# Patient Record
(demographics unavailable — no encounter records)

---

## 2024-11-27 NOTE — HISTORY OF PRESENT ILLNESS
[FreeTextEntry8] : Patient presented today to establish care with a new provider.  She stated that she gets some episodes of dizziness with headache and blood pressure spikes here and there.  She checks her blood pressure at home but she does not document it and she does not remember the readings.  Last few readings are in 120s.  She stated that 1 time she ended up being in a hospital because of that.  But she was not started on any medications.  And she does not remember how high her blood pressure was.  On today's visit patient's blood pressure was normal.  Patient uses a wrist device to measure her blood pressure Works as a

## 2025-03-23 NOTE — PHYSICAL EXAM
[No Acute Distress] : no acute distress [Well Nourished] : well nourished [Well Developed] : well developed [Well-Appearing] : well-appearing [Normal Sclera/Conjunctiva] : normal sclera/conjunctiva [PERRL] : pupils equal round and reactive to light [EOMI] : extraocular movements intact [Normal Outer Ear/Nose] : the outer ears and nose were normal in appearance [Normal Oropharynx] : the oropharynx was normal [No JVD] : no jugular venous distention [No Lymphadenopathy] : no lymphadenopathy [Supple] : supple [Thyroid Normal, No Nodules] : the thyroid was normal and there were no nodules present [No Respiratory Distress] : no respiratory distress  [No Accessory Muscle Use] : no accessory muscle use [Clear to Auscultation] : lungs were clear to auscultation bilaterally [Normal Rate] : normal rate  [Regular Rhythm] : with a regular rhythm [Normal S1, S2] : normal S1 and S2 [No Murmur] : no murmur heard [No Carotid Bruits] : no carotid bruits [No Abdominal Bruit] : a ~M bruit was not heard ~T in the abdomen [No Varicosities] : no varicosities [Pedal Pulses Present] : the pedal pulses are present [No Edema] : there was no peripheral edema [No Palpable Aorta] : no palpable aorta [No Extremity Clubbing/Cyanosis] : no extremity clubbing/cyanosis [Soft] : abdomen soft [Non Tender] : non-tender [Non-distended] : non-distended [No Masses] : no abdominal mass palpated [No HSM] : no HSM [Normal Bowel Sounds] : normal bowel sounds [Normal Posterior Cervical Nodes] : no posterior cervical lymphadenopathy [Normal Anterior Cervical Nodes] : no anterior cervical lymphadenopathy [No CVA Tenderness] : no CVA  tenderness [No Spinal Tenderness] : no spinal tenderness [No Joint Swelling] : no joint swelling [Grossly Normal Strength/Tone] : grossly normal strength/tone [No Rash] : no rash [Coordination Grossly Intact] : coordination grossly intact [No Focal Deficits] : no focal deficits [Normal Gait] : normal gait [Deep Tendon Reflexes (DTR)] : deep tendon reflexes were 2+ and symmetric [Normal Affect] : the affect was normal [Normal Insight/Judgement] : insight and judgment were intact [Normal TMs] : both tympanic membranes were normal [Normal Supraclavicular Nodes] : no supraclavicular lymphadenopathy [Normal Mood] : the mood was normal

## 2025-03-24 NOTE — HEALTH RISK ASSESSMENT
[Yes] : Yes [2 - 4 times a month (2 pts)] : 2-4 times a month (2 points) [1 or 2 (0 pts)] : 1 or 2 (0 points) [Never (0 pts)] : Never (0 points) [No] : In the past 12 months have you used drugs other than those required for medical reasons? No [1] : 2) Feeling down, depressed, or hopeless for several days (1) [Patient reported mammogram was normal] : Patient reported mammogram was normal [Patient reported PAP Smear was normal] : Patient reported PAP Smear was normal [Patient reported colonoscopy was normal] : Patient reported colonoscopy was normal [With Family] : lives with family [# of Members in Household ___] :  household currently consist of [unfilled] member(s) [Employed] : employed [] :  [# Of Children ___] : has [unfilled] children [Sexually Active] : sexually active [Feels Safe at Home] : Feels safe at home [Never] : Never [Fully functional (bathing, dressing, toileting, transferring, walking, feeding)] : Fully functional (bathing, dressing, toileting, transferring, walking, feeding) [Fully functional (using the telephone, shopping, preparing meals, housekeeping, doing laundry, using] : Fully functional and needs no help or supervision to perform IADLs (using the telephone, shopping, preparing meals, housekeeping, doing laundry, using transportation, managing medications and managing finances) [Audit-CScore] : 2 [de-identified] : active, not exercising.  [de-identified] : eats vegetables, fruits, proteins. Intermittent fasting. Fast food once a week. Occasional sodas.  [[id number]] : 2 [Change in mental status noted] : No change in mental status noted [Reports changes in hearing] : Reports no changes in hearing [Reports changes in vision] : Reports no changes in vision [Reports changes in dental health] : Reports no changes in dental health [MammogramDate] : 11/23 [PapSmearDate] : 06/24 [ColonoscopyDate] : 01/19 [de-identified] : School .  [de-identified] : uses glasses, recent eval.

## 2025-03-24 NOTE — HEALTH RISK ASSESSMENT
[Yes] : Yes [2 - 4 times a month (2 pts)] : 2-4 times a month (2 points) [1 or 2 (0 pts)] : 1 or 2 (0 points) [Never (0 pts)] : Never (0 points) [No] : In the past 12 months have you used drugs other than those required for medical reasons? No [1] : 2) Feeling down, depressed, or hopeless for several days (1) [Patient reported mammogram was normal] : Patient reported mammogram was normal [Patient reported PAP Smear was normal] : Patient reported PAP Smear was normal [Patient reported colonoscopy was normal] : Patient reported colonoscopy was normal [With Family] : lives with family [# of Members in Household ___] :  household currently consist of [unfilled] member(s) [Employed] : employed [] :  [# Of Children ___] : has [unfilled] children [Sexually Active] : sexually active [Feels Safe at Home] : Feels safe at home [Never] : Never [Fully functional (bathing, dressing, toileting, transferring, walking, feeding)] : Fully functional (bathing, dressing, toileting, transferring, walking, feeding) [Fully functional (using the telephone, shopping, preparing meals, housekeeping, doing laundry, using] : Fully functional and needs no help or supervision to perform IADLs (using the telephone, shopping, preparing meals, housekeeping, doing laundry, using transportation, managing medications and managing finances) [Audit-CScore] : 2 [de-identified] : active, not exercising.  [de-identified] : eats vegetables, fruits, proteins. Intermittent fasting. Fast food once a week. Occasional sodas.  [[id number]] : 2 [Change in mental status noted] : No change in mental status noted [Reports changes in hearing] : Reports no changes in hearing [Reports changes in vision] : Reports no changes in vision [Reports changes in dental health] : Reports no changes in dental health [MammogramDate] : 11/23 [PapSmearDate] : 06/24 [ColonoscopyDate] : 01/19 [de-identified] : School .  [de-identified] : uses glasses, recent eval.

## 2025-03-24 NOTE — HISTORY OF PRESENT ILLNESS
[FreeTextEntry1] : CPE  Establish care [de-identified] : Pt is 62 year old female with PMH of anxiety and seasonal depression, urinary incontinence, KEISHA on CPAP, visiting for CPE and establish care.   Pt has been on Lexapro for more than 1 year and she feels better.  Pt would like to be evaluated for ADHD. Per pt she has had symptoms all her life and worse for the past 2 years. She has expressed concerns about memory issues/changes that she believes might be signs of ADHD. The patient reports being easily distracted and forgetful, she forgets simple things from daily life worsening over the past 1-2 years. H/o "stomach issues", reports abdominal discomfort and bloating, but now she reports new constipation with BM every 2 days for the past 3 months. No melena or hematochezia.  Last PAP: 6/2024 Last mammo: 11/2023. Dexa never done  Colonoscopy: more than 5 years ago, does not recall year Shingles not vaccinated.  Pneumonia, flu, Covid vaccinated

## 2025-03-24 NOTE — ASSESSMENT
[FreeTextEntry1] : CPE EKG: NSR Updated with PAP Mammo referral given  GI - colonoscopy referral Recommended to receive shingles vaccine at her pharmacy. Information provided about no indication for polio and TB vaccine not available/indicated.  Labs ordered.

## 2025-03-24 NOTE — HISTORY OF PRESENT ILLNESS
[FreeTextEntry1] : CPE  Establish care [de-identified] : Pt is 62 year old female with PMH of anxiety and seasonal depression, urinary incontinence, KEISHA on CPAP, visiting for CPE and establish care.   Pt has been on Lexapro for more than 1 year and she feels better.  Pt would like to be evaluated for ADHD. Per pt she has had symptoms all her life and worse for the past 2 years. She has expressed concerns about memory issues/changes that she believes might be signs of ADHD. The patient reports being easily distracted and forgetful, she forgets simple things from daily life worsening over the past 1-2 years. H/o "stomach issues", reports abdominal discomfort and bloating, but now she reports new constipation with BM every 2 days for the past 3 months. No melena or hematochezia.  Last PAP: 6/2024 Last mammo: 11/2023. Dexa never done  Colonoscopy: more than 5 years ago, does not recall year Shingles not vaccinated.  Pneumonia, flu, Covid vaccinated

## 2025-05-21 NOTE — REASON FOR VISIT
[Initial Evaluation] : an initial evaluation [FreeTextEntry1] : colon cancer screening, epigastric pain

## 2025-05-21 NOTE — ASSESSMENT
[FreeTextEntry1] : 62 year old female of average risk for colorectal neoplasm presenting for colon cancer screening. Mild constipation about 2 months ago that self resolved with increased water intake. No rectal bleeding or lower abdominal pain.  Per AGA and ACG guidelines, average risk individuals should have a screening colonoscopy every 10 years. Those with increased risk include personal history of colon cancer or colon polyps, or a first degree relative (parent, sibling, child) with colon cancer or polyps, are to be screened every 5 years. We will obtain the records from her previous GI to determine when her next colonoscopy is due as she believes it was only 5 years ago.   Mild epigastric tenderness on exam but reports GERD symptoms are doing well right now. Will start Famotidine 40 mg QD due to epigastric pain and tenderness. Declines EGD at this time.   Obtain records from Dr. Medina for review and to determine when screening colonoscopy is due

## 2025-05-21 NOTE — HISTORY OF PRESENT ILLNESS
[FreeTextEntry1] : FATEMEH WALLER is a 62 year old female with PMH of HTN, HLD, severe KEISHA, presenting today for colon cancer screening. Last colonoscopy was about 5 years ago with [facility] and was normal. No history of colon polyps. No family history of colon cancer or polyps. She currently feels well and offers no complaints. She reports an episode of constipation about 3 months ago that lasted for about 3-4 weeks where she was moving her bowels every 2 days instead of every 3. She increased her water intake and the constipation has resolved. Denies lower abdominal pain, rectal bleeding.   She has a history of chronic GERD for many years but reports that it has been pretty stable for the last few years and she only has "flare ups" every few weeks for which she will use Pepcid or Gaviscon. Denies heartburn, bloating, nausea, vomiting, dysphagia, weight loss. She has some mild epigastric discomfort. Reports 2 EGD's within the last 3-5 years. She was treated for H pylori about 3 years ago and it was successfully eradicated.

## 2025-05-21 NOTE — PHYSICAL EXAM
[Alert] : alert [Normal Voice/Communication] : normal voice/communication [Healthy Appearing] : healthy appearing [No Acute Distress] : no acute distress [Sclera] : the sclera and conjunctiva were normal [Hearing Threshold Finger Rub Not Fisher] : hearing was normal [Normal Lips/Gums] : the lips and gums were normal [Oropharynx] : the oropharynx was normal [Normal Appearance] : the appearance of the neck was normal [No Neck Mass] : no neck mass was observed [No Respiratory Distress] : no respiratory distress [No Acc Muscle Use] : no accessory muscle use [Respiration, Rhythm And Depth] : normal respiratory rhythm and effort [Auscultation Breath Sounds / Voice Sounds] : lungs were clear to auscultation bilaterally [Heart Rate And Rhythm] : heart rate was normal and rhythm regular [Normal S1, S2] : normal S1 and S2 [Murmurs] : no murmurs [Bowel Sounds] : normal bowel sounds [No Masses] : no abdominal mass palpated [Abdomen Soft] : soft [] : no hepatosplenomegaly [Epigastric] : in the epigastric area [Oriented To Time, Place, And Person] : oriented to person, place, and time

## 2025-06-27 NOTE — DISCUSSION/SUMMARY
[FreeTextEntry1] : frederick detrol dw pt, prescribed.  frederick top e2 dw pt, prescribed.  rto pel us.  spent 30 min i n consultation.

## 2025-06-27 NOTE — HISTORY OF PRESENT ILLNESS
[FreeTextEntry1] : 63 yo pt here for gyn visit.  co cramping on and off  for a few months. co rome w cough , sometimes w urgency, happens on and off for past year. also sometimes inability to empty bladder.   meds-e scitalopram, for anxiety all-codeine surg- cs, cervical surg for dysplasia fam hx- m- lymphoma, br ca heart dxz, htn s-htn f- heart issues  denies vb, dc, pel pain + sa- reports some pain after having sex.   doing intermittent fasting to try to lose weight, cutting carbbs.  tried vag estrogens but didint keep it up was using detrol, was helping, but new insurance didnt cover it, now taking magnesium which is also helping, and hydrating /